# Patient Record
Sex: MALE | Race: WHITE | NOT HISPANIC OR LATINO | Employment: OTHER | URBAN - METROPOLITAN AREA
[De-identification: names, ages, dates, MRNs, and addresses within clinical notes are randomized per-mention and may not be internally consistent; named-entity substitution may affect disease eponyms.]

---

## 2019-08-01 ENCOUNTER — TELEPHONE (OUTPATIENT)
Dept: FAMILY MEDICINE | Facility: CLINIC | Age: 64
End: 2019-08-01

## 2019-08-01 NOTE — TELEPHONE ENCOUNTER
----- Message from Tamia Katz sent at 8/1/2019  9:26 AM CDT -----  Contact: Self  Type:  RX Refill Request    Who Called:  Self  Refill or New Rx:  Refill  RX Name and Strength:  Pantoprazole 40 gmg  How is the patient currently taking it? (ex. 1XDay):    Is this a 30 day or 90 day RX:  90  Preferred Pharmacy with phone number:  [facility]  Local or Mail Order:  Local  Ordering Provider:  Feliz  Best Call Back Number:  [phone] (home)   Additional Information:  Patient said he needs this today he is out and he is going out of town he has been waiting on a call from the Dr office and the pharm has been reaching out to the Dr no one has returned the pharmacist calls

## 2019-08-01 NOTE — TELEPHONE ENCOUNTER
Attempted to reach pt to inquire if he has seen Dr. Piper previously and if so, when.  Pt has no visible historical appointments with Dr. Piper.  Alexia